# Patient Record
Sex: FEMALE | Race: WHITE | NOT HISPANIC OR LATINO | Employment: OTHER | URBAN - METROPOLITAN AREA
[De-identification: names, ages, dates, MRNs, and addresses within clinical notes are randomized per-mention and may not be internally consistent; named-entity substitution may affect disease eponyms.]

---

## 2023-11-08 PROBLEM — H35.62 RETINAL HEMORRHAGE OF LEFT EYE: Status: ACTIVE | Noted: 2023-11-08

## 2023-11-08 PROBLEM — H43.812 POSTERIOR VITREOUS DETACHMENT OF LEFT EYE: Status: ACTIVE | Noted: 2023-11-08

## 2023-11-08 PROBLEM — E89.6 HISTORY OF TOTAL ADRENALECTOMY (MULTI): Status: ACTIVE | Noted: 2023-11-08

## 2023-11-08 PROBLEM — C34.90: Status: ACTIVE | Noted: 2023-11-08

## 2023-11-08 PROBLEM — R10.12 CHRONIC LUQ PAIN: Status: ACTIVE | Noted: 2023-11-08

## 2023-11-08 PROBLEM — C79.70: Status: ACTIVE | Noted: 2023-11-08

## 2023-11-08 PROBLEM — R92.8 ABNORMAL MRI, BREAST: Status: ACTIVE | Noted: 2023-11-08

## 2023-11-08 PROBLEM — N64.89: Status: ACTIVE | Noted: 2023-11-08

## 2023-11-08 PROBLEM — Z87.891 HISTORY OF SMOKING 30 OR MORE PACK YEARS: Status: ACTIVE | Noted: 2023-11-08

## 2023-11-08 PROBLEM — E27.9 ADRENAL NODULE: Status: ACTIVE | Noted: 2023-11-08

## 2023-11-08 PROBLEM — E27.8 ADRENAL NODULE (MULTI): Status: ACTIVE | Noted: 2023-11-08

## 2023-11-08 PROBLEM — G89.29 CHRONIC LUQ PAIN: Status: ACTIVE | Noted: 2023-11-08

## 2023-11-08 PROBLEM — R04.2 BLOOD-TINGED SPUTUM: Status: ACTIVE | Noted: 2023-11-08

## 2023-11-08 PROBLEM — C34.90 LUNG CANCER (MULTI): Status: ACTIVE | Noted: 2023-11-08

## 2023-11-08 RX ORDER — FOLIC ACID 1 MG/1
1 TABLET ORAL DAILY
COMMUNITY
Start: 2021-01-27

## 2023-11-08 RX ORDER — ONDANSETRON 8 MG/1
1 TABLET, ORALLY DISINTEGRATING ORAL EVERY 8 HOURS
COMMUNITY
Start: 2020-12-16

## 2023-11-08 RX ORDER — ZOLPIDEM TARTRATE 5 MG/1
TABLET ORAL
COMMUNITY
Start: 2020-12-16

## 2023-11-08 RX ORDER — DEXAMETHASONE 4 MG/1
TABLET ORAL
COMMUNITY
Start: 2020-10-07

## 2023-11-08 RX ORDER — DIPHENHYDRAMINE HCL 25 MG
CAPSULE ORAL
COMMUNITY

## 2023-11-08 RX ORDER — PROCHLORPERAZINE MALEATE 10 MG
TABLET ORAL
COMMUNITY
Start: 2020-10-07

## 2023-11-08 RX ORDER — IBUPROFEN 400 MG/1
TABLET ORAL
COMMUNITY

## 2023-11-08 RX ORDER — HYDROCODONE BITARTRATE AND ACETAMINOPHEN 5; 325 MG/1; MG/1
1 TABLET ORAL EVERY 6 HOURS PRN
COMMUNITY
Start: 2021-08-10

## 2023-11-08 RX ORDER — DOCUSATE SODIUM 100 MG/1
CAPSULE, LIQUID FILLED ORAL
COMMUNITY
Start: 2020-09-19

## 2023-11-08 RX ORDER — AMOXICILLIN 250 MG
CAPSULE ORAL
COMMUNITY

## 2023-11-08 RX ORDER — TRAMADOL HYDROCHLORIDE 50 MG/1
TABLET ORAL
COMMUNITY

## 2023-11-08 RX ORDER — PEMETREXED 100 MG/4ML
INJECTION, POWDER, LYOPHILIZED, FOR SOLUTION INTRAVENOUS
COMMUNITY

## 2023-11-08 RX ORDER — GABAPENTIN 100 MG/1
1 CAPSULE ORAL 3 TIMES DAILY
COMMUNITY
Start: 2021-03-02

## 2023-11-08 RX ORDER — ACETAMINOPHEN 325 MG/1
TABLET ORAL
COMMUNITY

## 2023-11-08 NOTE — PROGRESS NOTES
Subjective   Denae Al  is a 68 y.o. female who presents for evaluation of left lung cancer status post left pneumonectomy on 8/10/20. This patient has a pathologic T4N0 lung cancer status post surgical resection with negative surgical margins.      This patient presented to medical attention for a left sided lung mass. This was biopsied and was cancer. She also had concerns for adrenal metastasis, but given the localized nature of her disease, I recommended surgical resection. She was taken to the operating room on 8/10/20 and underwent a left pneumonectomy. Intraoperatively, the tumor was found to have mediastinal invasion and partial esophagectomy was also performed concurrently. Postoperatively, the patient did remarkably well and was discharged home. She was treated post-operatively with chemotherapy by medical oncology in the [facility] area with maintenance Keytruda, which she stopped in march 2023 based on concerns of side effects. .      Currently the patient is  Feeling great, waling 10K steps per day . They deny the following symptoms: chest pain, shortness of breath at rest, shortness of breath with activity, cough, hemoptysis, fevers, chills, and weight loss.      There have been no significant changes to their documented medical, surgical and family history.      Cancer-related family history is not on file.  Social History    Tobacco Use      Smoking status: Not on file      Smokeless tobacco: Not on file    Objective   Physical Exam  Phone visit  Diagnostic Studies  I reviewed a CT chest performed recently. I do not see any new or concerning nodules or adenopathy    Assessment/Plan   Overall, I believe that the patient is doing well.     Based on the patient's remote clinical evaluation and her recent radiographic imaging, I believe she has no evidence of disease.  Her local oncology team will continue to follow her with surveillance imaging, which I believe is appropriate.  I am happy to  review her scans remotely, and encouraged the patient to reach out if she has any questions or concerns about her care or symptoms of recurrent cancer    I recommend  ongoing radiographic surveillance by local oncology team .     I discussed this in detail with the patient, including a discussion of alternatives. They were comfortable with this approach.     Isrrael Kearns MD  [phone]

## 2023-11-09 ENCOUNTER — TELEMEDICINE (OUTPATIENT)
Dept: SURGERY | Facility: HOSPITAL | Age: 68
End: 2023-11-09
Payer: MEDICARE

## 2023-11-09 DIAGNOSIS — C34.92 MALIGNANT NEOPLASM OF LEFT LUNG, UNSPECIFIED PART OF LUNG (MULTI): Primary | ICD-10-CM

## 2023-11-09 PROCEDURE — 99214 OFFICE O/P EST MOD 30 MIN: CPT | Mod: 95 | Performed by: THORACIC SURGERY (CARDIOTHORACIC VASCULAR SURGERY)

## 2023-11-09 PROCEDURE — 99214 OFFICE O/P EST MOD 30 MIN: CPT | Performed by: THORACIC SURGERY (CARDIOTHORACIC VASCULAR SURGERY)

## 2024-05-07 NOTE — PROGRESS NOTES
"Renate Al  is a 69 y.o. female who presents for evaluation of left lung cancer status post left pneumonectomy on 8/10/20. This patient has a pathologic T4N0 lung cancer status post surgical resection with negative surgical margins.      This patient presented to medical attention for a left sided lung mass. This was biopsied and was cancer. She also had concerns for adrenal metastasis, but given the localized nature of her disease, I recommended surgical resection. She was taken to the operating room on 8/10/20 and underwent a left pneumonectomy. Intraoperatively, the tumor was found to have mediastinal invasion and partial esophagectomy was also performed concurrently. Postoperatively, the patient did remarkably well and was discharged home. She was treated post-operatively with chemotherapy by medical oncology in the [facility] area with maintenance Keytruda, which she stopped in march 2023 based on concerns of side effects.    Currently the patient is in their usual state of health. \"I feel great\". She denies the following symptoms: chest pain, shortness of breath at rest, shortness of breath with activity, cough, hemoptysis, fevers, chills, and weight loss.      There have been no significant changes to their documented medical, surgical and family history.     Objective   Physical Exam  Phone visit  Diagnostic Studies  I have reviewed a CT which shows no evidence of cancer recurrence    INDICATION:  History of lung cancer, follow-up      COMPARISON:  CT chest 1/11/2024, CT abdomen and pelvis 10/2/2023, 12/30/2022, 3/15/2022     TECHNIQUE: Helically acquired CT through the thorax, abdomen and pelvis after the administration of IV contrast, acquired in the portal venous phase.     FINDINGS:    CHEST:   Lungs:Patient status post left pneumonectomy. Simple attenuating fluid with rim thickening in the left hemithorax and right to left mediastinal shift has not changed recently. No pneumothorax. " Right lower lobe nodule spanning approximately 9 mm is unchanged since 2022 and most likely represents pulmonary lymph node. No new pulmonary nodules. Peripheral tree-in-bud opacities seen in the periphery of the right lower lobe. Centrilobular emphysematous changes are noted.   Neck: The thyroid gland is normal.   Mediastinum: Mild coronary artery calcifications present. Nonaneurysmal thoracic aorta. Patent main pulmonary artery.   Chest wall: No axillary adenopathy by size criteria.     ABDOMEN/PELVIS:   Hepatobiliary: Hypodense lesion attenuating approximately 30 Hounsfield units with peripheral calcifications noted in the right hepatic lobe measures approximately 7.1 x 9.0 cm on the current study and previously measured 7.8 x 8.8 cm (remeasured). Remaining hypodense hepatic lesions seen throughout the right left hepatic lobes have not changed in number nor significantly in size. No biliary dilatation. Main portal vein is patent. The gallbladder is normal.   Spleen: Normal.   Pancreas: No peripancreatic inflammatory change..   Adrenals: Normal.   Kidneys: Symmetric nephrograms.  Stable bilateral hypodense renal lesions, too small to characterize but statistically cysts. The largest measures approximately 9 mm in the inferior right renal pole and is unchanged since 2022.. No hydronephrosis.   Pelvic organs/viscera: No mass identified.   Bowel: No dilated loops of small or large bowel. The appendix is unremarkable.No pericolonic inflammatory change. Colonic diverticulosis noted. Free fluid:  No free fluid or free air..    Lymphadenopathy: No adenopathy by size criteria..   Abdominal wall: Small fat-containing umbilical hernia..   Osseous structures:  No concerning lytic or sclerotic osseous lesions. Interval development of a left-sided ninth rib fracture with periosteal reaction. Stable vertebral body height loss noted at the T9 and T10 levels. No concerning lytic or sclerotic osseous lesions.     Assessment/Plan    Overall, I believe that the patient is doing well.     Based on the patient's clinical presentation and my review of their radiographic imaging, I believe they have no evidence of cancer recurrence.  I recommend ongoing radiographic screening.    I recommend CT chest 12 months    I discussed this in detail with the patient, including a discussion of alternatives. They were comfortable with this approach.     Isrrael Kearns MD  [phone]

## 2024-05-16 ENCOUNTER — TELEMEDICINE (OUTPATIENT)
Dept: SURGERY | Facility: HOSPITAL | Age: 69
End: 2024-05-16
Payer: MEDICARE

## 2024-05-16 DIAGNOSIS — C34.92 MALIGNANT NEOPLASM OF LEFT LUNG, UNSPECIFIED PART OF LUNG (MULTI): Primary | ICD-10-CM

## 2024-05-16 PROCEDURE — 1157F ADVNC CARE PLAN IN RCRD: CPT | Performed by: THORACIC SURGERY (CARDIOTHORACIC VASCULAR SURGERY)

## 2024-05-16 PROCEDURE — 99214 OFFICE O/P EST MOD 30 MIN: CPT | Performed by: THORACIC SURGERY (CARDIOTHORACIC VASCULAR SURGERY)

## 2025-05-07 NOTE — PROGRESS NOTES
"Renate Al  is a 70 y.o. female who presents for evaluation of left lung cancer status post left pneumonectomy on 8/10/20. This patient has a pathologic T4N0 lung cancer status post surgical resection with negative surgical margins.      This patient presented to medical attention for a left sided lung mass. This was biopsied and was cancer. She also had concerns for adrenal metastasis, but given the localized nature of her disease, I recommended surgical resection. She was taken to the operating room on 8/10/20 and underwent a left pneumonectomy. Intraoperatively, the tumor was found to have mediastinal invasion and partial esophagectomy was also performed concurrently. Postoperatively, the patient did remarkably well and was discharged home. She was treated post-operatively with chemotherapy by medical oncology in the [facility] area with maintenance Keytruda, which she stopped in march 2023 based on concerns of side effects.    Currently the patient is presenting for routine follow-up and in their usual state of health. She denies the following symptoms: chest pain, shortness of breath at rest, shortness of breath with activity, cough, hemoptysis, fevers, chills, and weight loss.  She feels \"great\" and is walking >10k steps per days    There have been no significant changes to their documented medical, surgical and family history.       Objective   Physical Exam  The patient appeared well on the video system. They were alert and oriented. (audio and video evaluation)  Diagnostic Studies  === 04/11/25 ===    CT - ONBASE SCAN - No eveidence of recurrence. 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 Atypical hemangioma is also possible. Metastatic disease is less likely.       Assessment/Plan   I believe that the patient has no evidence of cancer recurrence.     Based on the patient's clinical " presentation and my review of their radiographic imaging, I believe they have no evidence of cancer recurrence.  I recommend ongoing radiographic screening.    I recommend ongoing radiographic surveillance with parallel phone call follow-up    I discussed this in detail with the patient, including a discussion of alternatives. They were comfortable with this approach.     Isrrael Kearns MD  [phone]  consent obtained, Time: 10  min.

## 2025-05-15 ENCOUNTER — TELEMEDICINE (OUTPATIENT)
Dept: SURGERY | Facility: HOSPITAL | Age: 70
End: 2025-05-15
Payer: MEDICARE

## 2025-05-15 DIAGNOSIS — C34.92 MALIGNANT NEOPLASM OF LEFT LUNG, UNSPECIFIED PART OF LUNG (MULTI): Primary | ICD-10-CM

## 2025-05-15 PROCEDURE — 1157F ADVNC CARE PLAN IN RCRD: CPT | Performed by: THORACIC SURGERY (CARDIOTHORACIC VASCULAR SURGERY)

## 2025-05-15 PROCEDURE — 99212 OFFICE O/P EST SF 10 MIN: CPT | Performed by: THORACIC SURGERY (CARDIOTHORACIC VASCULAR SURGERY)

## 2025-07-08 DIAGNOSIS — Z12.31 ENCOUNTER FOR SCREENING MAMMOGRAM FOR BREAST CANCER: ICD-10-CM

## 2025-08-03 ENCOUNTER — HOSPITAL ENCOUNTER (OUTPATIENT)
Dept: RADIOLOGY | Facility: EXTERNAL LOCATION | Age: 70
Discharge: HOME | End: 2025-08-03
Payer: MEDICARE

## 2025-08-04 NOTE — PROGRESS NOTES
Subjective   Denae Al  is a 70 y.o. female who presents for evaluation of left lung cancer status post left pneumonectomy on 8/10/20. This patient has a pathologic T4N0 lung cancer status post surgical resection with negative surgical margins.      This patient presented to medical attention for a left sided lung mass. This was biopsied and was cancer. She also had concerns for adrenal metastasis, but given the localized nature of her disease, I recommended surgical resection. She was taken to the operating room on 8/10/20 and underwent a left pneumonectomy. Intraoperatively, the tumor was found to have mediastinal invasion and partial esophagectomy was also performed concurrently. Postoperatively, the patient did remarkably well and was discharged home. She was treated post-operatively with chemotherapy by medical oncology in the [facility] area with maintenance Keytruda, which she stopped in march 2023 based on concerns of side effects.    Currently the patient is reporting to discuss the results of a PET. She is scheduled to get a MRI of the spine. She denies the following symptoms: chest pain, shortness of breath at rest, shortness of breath with activity, cough, hemoptysis, fevers, chills, weight loss, and back pain.      There have been no significant changes to their documented medical, surgical and family history.     She  reports that she has quit smoking. Her smoking use included cigarettes. She has never used smokeless tobacco. She reports current alcohol use of about 5.0 standard drinks of alcohol per week. She reports that she does not use drugs.    Objective   Physical Exam  The patient appeared well on the video system. They were alert and oriented. (audio and video evaluation)  Diagnostic Studies  I have reviewed a PET : poss uptake in spine and liver mass. (Liver mass is stable)    Assessment/Plan   I believe that the patient is doing well.     Based on the patient's clinical presentation and  "my review of their radiographic imaging, I believe they have no evidence of cancer recurrence.  The liver lesion has been stable for many years and I think is very likely benign. I also believe the spine lesion is likely to be a \"false positive\" on the PET.  Nonetheless, an MRI of the spine is reasonable.  The patient has been followed for many years since surgery with regular imaging, and the PET scan confirms my impression that she has no evidence of disease (pending of course the MRI of the spine).  If the MRI and the PET/CT are both negative, I see no reason she requires radiographic surveillance of 3-month intervals.  Typically, I recommend close imaging after surgical resection with scanning every 3 to 6 months for the first 2 years and every 6-12 after that.  The patient is now 5 years from surgery, and I believe scans at intervals of 6 months is reasonable, perhaps even exploring yearly scans moving forward.    I recommend radiographic surveillance    I discussed this in detail with the patient, including a discussion of alternatives. They were comfortable with this approach.     Isrrael Kearns MD  [phone]    "

## 2025-08-07 ENCOUNTER — TELEMEDICINE (OUTPATIENT)
Dept: SURGERY | Facility: HOSPITAL | Age: 70
End: 2025-08-07
Payer: MEDICARE

## 2025-08-07 DIAGNOSIS — C34.92 MALIGNANT NEOPLASM OF LEFT LUNG, UNSPECIFIED PART OF LUNG (MULTI): Primary | ICD-10-CM

## 2025-08-07 PROCEDURE — 99214 OFFICE O/P EST MOD 30 MIN: CPT | Performed by: THORACIC SURGERY (CARDIOTHORACIC VASCULAR SURGERY)
